# Patient Record
Sex: MALE | Race: WHITE
[De-identification: names, ages, dates, MRNs, and addresses within clinical notes are randomized per-mention and may not be internally consistent; named-entity substitution may affect disease eponyms.]

---

## 2019-01-14 ENCOUNTER — HOSPITAL ENCOUNTER (EMERGENCY)
Dept: HOSPITAL 80 - FED | Age: 39
Discharge: HOME | End: 2019-01-14
Payer: COMMERCIAL

## 2019-01-14 VITALS — DIASTOLIC BLOOD PRESSURE: 86 MMHG | SYSTOLIC BLOOD PRESSURE: 129 MMHG
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
## 2019-01-14 DIAGNOSIS — Z23: ICD-10-CM

## 2019-01-14 DIAGNOSIS — S61.213A: Primary | ICD-10-CM
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
## 2019-01-14 NOTE — EDPHY
General


Time Seen by Provider: 01/14/19 11:20


Narrative: 





CLINICAL IMPRESSION: 





Skin avulsion


________________________


ASSESSMENT/PLAN: 





Patient is a 39 year old male who presents to the ED for evaluation of left 

middle finger laceration. Patient is not toxic appearing, he is in no distress. 

Physical examination reveals skin avulsion to left medial, middle distal phalanx

; corner of nail missing however no germinal matrix damage or subungual 

hematoma.  There was no evidence of deep structure involvement, neurovascular 

compromise, foreign body, or bony involvement. The wound was not contaminated, 

tetanus status was updated today. The wound was irrigated and dressed with 

Xeroform and sterile dressing.  Wound care instructions discussed with patient 

and family member. I discussed importance of scheduling an appointment for 

wound check.  Return precautions discussed- he will return for increased pain, 

signs of infection, fever, vomiting, if the wound opens or for any other 

concerns. Patient and family member both verbalize understanding and are in 

agreement with plan.


___________________________


DIFFERENTIAL DIAGNOSIS: 


Includes but not limited to laceration of tendon or vascular structure, 

underlying fracture, laceration with retained FB, skin avulsion


____________________


CHIEF COMPLAINT: Laceration 


____________________


HPI: 





Patient is a 39-year-old male with no significant medical history who presents 

to the emergency department with a skin avulsion that he sustained while 

cutting and onion.  Patient reports just prior to arrival he was cutting and 

onion when he accidentally cut the side of his left middle finger.  Patient was 

able to get the bleeding under control.  He denies any numbness or tingling of 

the distal digit.  Patient denies any other injury or complaint.  He is right-

hand dominant, his tetanus status is not up-to-date.


_________________________


PAST MEDICAL HISTORY:  Denies 


Pertinent Past Surgical History:  Not contributory 


Social History:  Smoker 


_____________________


REVIEW OF SYSTEMS: 


All other systems negative 


Constitutional: No fever, no chills. 


Musculoskeletal: No deformity, no joint pain. 


Skin:  Left middle finger laceration


Neurological: No sensory loss or weakness. 


_________________


PHYSICAL EXAM: 


General Appearance: Well developed, well appearing, no distress. 


Neurological: Alert and oriented x 3. Nonfocal.


Skin:  Superficial skin avulsion approximately 1 cm in length, 0.5 cm wide.  

There is no bony exposure or other deep structure exposure. 2 pt discrimination 

intact.


Upper Extremities: Skin avulsion as mentioned above.  UE otherwise 

unremarkable.  Full range of motion intact, no ecchymosis or edema.


Lower Extremities: Intact distal pulses, No edema, No tenderness, No cyanosis, 

full range of motion intact, No calf tenderness bilaterally.





_____________________________


MEDICAL DECISION MAKING: 


Patient was seen independently. Secondary supervising physician at time of 

evaluation was Dr. Lake. 


Diagnosis:  Left middle finger skin avulsion. 


Summary: See assessment and plan for summary of ED visit


Clinical lab tests: none 


Independent visualization of images, tracing, or specimens: not applicable. 


Decision to obtain medical records or history from someone other than the 

patient: yes, partner. 


Review / Summarize previous medical records: no. 


Dispo: Home.





- History


Smoking Status: Current every day smoker





- Objective


Vital Signs: 


 Initial Vital Signs











Temperature (C)  36.9 C   01/14/19 10:18


 


Heart Rate  83   01/14/19 10:18


 


Respiratory Rate  18   01/14/19 10:18


 


Blood Pressure  129/86 H  01/14/19 10:18


 


O2 Sat (%)  92   01/14/19 10:18








 











O2 Delivery Mode               Room Air














Allergies/Adverse Reactions: 


 





erythromycin base Allergy (Verified 01/14/19 10:17)


 








Home Medications: 














 Medication  Instructions  Recorded


 


NK [No Known Home Meds]  01/14/19











Medications Given: 


 








Discontinued Medications





Diphtheria/Tetanus/Acell Pertussis (Boostrix)  0.5 ml IM .ONCE ONE


   Stop: 01/14/19 10:50


   Last Admin: 01/14/19 10:56 Dose:  0.5 ml


Ibuprofen (Motrin)  600 mg PO EDNOW ONE


   Stop: 01/14/19 11:29


   Last Admin: 01/14/19 11:52 Dose:  600 mg








Departure





- Departure


Disposition: Home, Routine, Self-Care


Clinical Impression: 


 Avulsion of skin





Condition: Good


Instructions:  Skin Avulsion (ED)


Additional Instructions: 


DISCHARGE INSTRUCTIONS FROM YOUR DOCTOR 





Thank you for visiting our emergency department today. Please keep in mind that 

discharge from the emergency department does not mean that there is nothing 

wrong - it simply means that we have not identified an emergency condition that 

requires further evaluation or treatment in the hospital. You should always 

plan to follow up with primary care for re-evaluation of your condition in the 

next 2-3 days. 





Keep wound covered for 48 hours. Then remove dressing, clean at least twice 

daily or when soiled with soap and water, apply antibiotic ointment and 

dressing.





Tylenol every 4-6 hours as needed for pain. Do not exceed 4000 mg in 24 hours. 

Ibuprofen every 6-8 hours with food as needed for pain. Stop for stomach upset. 

Do not exceed 2400 mg in 24 hours.





Continue your regular medications as prescribed.





Schedule a follow-up visit with your primary care physician or the emergency 

department for wound check. 





Return for signs of wound infection ie: redness, swelling, drainage, foul odor, 

red streaks, fever, chills, pain, bleeding, if the wound opens or for any other 

new, worsening or worrisome symptoms.





People present with illnesses and injuries in different ways, and it is always 

possible that we have missed something. You may always return for re-evaluation 

if symptoms worsen or if they are not improving or if you develop new/different 

symptoms. 





Again, thank you for choosing our emergency department. We hope that you feel 

better.


Referrals: 


Paolo Vivar,  [Medical Doctor] - As per Instructions